# Patient Record
Sex: FEMALE | Race: WHITE | Employment: FULL TIME | URBAN - NONMETROPOLITAN AREA
[De-identification: names, ages, dates, MRNs, and addresses within clinical notes are randomized per-mention and may not be internally consistent; named-entity substitution may affect disease eponyms.]

---

## 2017-08-15 ENCOUNTER — HOSPITAL ENCOUNTER (OUTPATIENT)
Dept: GENERAL RADIOLOGY | Age: 27
Discharge: HOME | End: 2017-08-15
Payer: COMMERCIAL

## 2017-08-15 ENCOUNTER — HOSPITAL ENCOUNTER (OUTPATIENT)
Age: 27
Discharge: HOME | End: 2017-08-15
Payer: COMMERCIAL

## 2017-08-15 DIAGNOSIS — R52 PAIN: ICD-10-CM

## 2017-08-15 DIAGNOSIS — M54.2 NECK PAIN: ICD-10-CM

## 2017-08-15 PROCEDURE — 72040 X-RAY EXAM NECK SPINE 2-3 VW: CPT

## 2017-08-15 PROCEDURE — 73110 X-RAY EXAM OF WRIST: CPT

## 2018-03-16 ENCOUNTER — HOSPITAL ENCOUNTER (OUTPATIENT)
Dept: ULTRASOUND IMAGING | Age: 28
Discharge: HOME | End: 2018-03-16
Payer: COMMERCIAL

## 2018-03-16 DIAGNOSIS — R52 PAIN: ICD-10-CM

## 2018-03-16 PROCEDURE — 76705 ECHO EXAM OF ABDOMEN: CPT

## 2018-06-22 RX ORDER — SODIUM CHLORIDE 450 MG/100ML
INJECTION, SOLUTION INTRAVENOUS CONTINUOUS
Status: CANCELLED | OUTPATIENT
Start: 2018-06-22

## 2018-06-25 ENCOUNTER — HOSPITAL ENCOUNTER (OUTPATIENT)
Dept: GENERAL RADIOLOGY | Age: 28
Discharge: HOME | End: 2018-06-25
Payer: COMMERCIAL

## 2018-06-25 ENCOUNTER — HOSPITAL ENCOUNTER (OUTPATIENT)
Dept: CT IMAGING | Age: 28
Discharge: HOME | End: 2018-06-25
Payer: COMMERCIAL

## 2018-06-25 VITALS
BODY MASS INDEX: 32.71 KG/M2 | RESPIRATION RATE: 16 BRPM | OXYGEN SATURATION: 99 % | DIASTOLIC BLOOD PRESSURE: 86 MMHG | HEART RATE: 60 BPM | WEIGHT: 228 LBS | TEMPERATURE: 98.8 F | SYSTOLIC BLOOD PRESSURE: 135 MMHG

## 2018-06-25 PROCEDURE — 72240 MYELOGRAPHY NECK SPINE: CPT

## 2018-06-25 PROCEDURE — 72126 CT NECK SPINE W/DYE: CPT

## 2018-06-25 PROCEDURE — 2580000003 HC RX 258: Performed by: RADIOLOGY

## 2018-06-25 PROCEDURE — 6360000004 HC RX CONTRAST MEDICATION: Performed by: ORTHOPAEDIC SURGERY

## 2018-06-25 RX ORDER — SODIUM CHLORIDE 450 MG/100ML
INJECTION, SOLUTION INTRAVENOUS CONTINUOUS
Status: DISCONTINUED | OUTPATIENT
Start: 2018-06-25 | End: 2018-06-26 | Stop reason: HOSPADM

## 2018-06-25 RX ADMIN — SODIUM CHLORIDE: 4.5 INJECTION, SOLUTION INTRAVENOUS at 08:10

## 2018-06-25 RX ADMIN — SODIUM CHLORIDE: 4.5 INJECTION, SOLUTION INTRAVENOUS at 08:17

## 2018-06-25 RX ADMIN — IOHEXOL 10 ML: 300 INJECTION, SOLUTION INTRAVENOUS at 09:28

## 2018-06-25 ASSESSMENT — PAIN SCALES - GENERAL
PAINLEVEL_OUTOF10: 10
PAINLEVEL_OUTOF10: 2
PAINLEVEL_OUTOF10: 3

## 2018-06-25 ASSESSMENT — PAIN - FUNCTIONAL ASSESSMENT: PAIN_FUNCTIONAL_ASSESSMENT: 0-10

## 2018-06-26 ENCOUNTER — HOSPITAL ENCOUNTER (EMERGENCY)
Age: 28
Discharge: HOME | End: 2018-06-26
Payer: COMMERCIAL

## 2018-06-26 VITALS
TEMPERATURE: 98.2 F | HEIGHT: 67 IN | BODY MASS INDEX: 35.79 KG/M2 | HEART RATE: 63 BPM | WEIGHT: 228 LBS | OXYGEN SATURATION: 98 % | SYSTOLIC BLOOD PRESSURE: 122 MMHG | DIASTOLIC BLOOD PRESSURE: 79 MMHG | RESPIRATION RATE: 16 BRPM

## 2018-06-26 DIAGNOSIS — G97.1 HEADACHE, POST-MYELOGRAM: Primary | ICD-10-CM

## 2018-06-26 LAB
ALBUMIN SERPL-MCNC: 4.6 G/DL (ref 3.5–5.1)
ALP BLD-CCNC: 82 U/L (ref 38–126)
ALT SERPL-CCNC: 19 U/L (ref 11–66)
AMPHETAMINE+METHAMPHETAMINE URINE SCREEN: NEGATIVE
ANION GAP SERPL CALCULATED.3IONS-SCNC: 16 MEQ/L (ref 8–16)
AST SERPL-CCNC: 14 U/L (ref 5–40)
BACTERIA: NORMAL
BARBITURATE QUANTITATIVE URINE: NEGATIVE
BASOPHILS # BLD: 0.3 %
BASOPHILS ABSOLUTE: 0 THOU/MM3 (ref 0–0.1)
BENZODIAZEPINE QUANTITATIVE URINE: NEGATIVE
BILIRUB SERPL-MCNC: 0.3 MG/DL (ref 0.3–1.2)
BILIRUBIN DIRECT: < 0.2 MG/DL (ref 0–0.3)
BILIRUBIN URINE: NEGATIVE
BLOOD, URINE: NEGATIVE
BUN BLDV-MCNC: 9 MG/DL (ref 7–22)
CALCIUM SERPL-MCNC: 10.2 MG/DL (ref 8.5–10.5)
CANNABINOID QUANTITATIVE URINE: NEGATIVE
CASTS: NORMAL /LPF
CASTS: NORMAL /LPF
CHARACTER, URINE: CLEAR
CHLORIDE BLD-SCNC: 106 MEQ/L (ref 98–111)
CO2: 18 MEQ/L (ref 23–33)
COCAINE METABOLITE QUANTITATIVE URINE: NEGATIVE
COLOR: YELLOW
CREAT SERPL-MCNC: 0.8 MG/DL (ref 0.4–1.2)
CRYSTALS: NORMAL
D-DIMER QUANTITATIVE: 291 NG/ML FEU (ref 0–500)
EKG ATRIAL RATE: 58 BPM
EKG P AXIS: 72 DEGREES
EKG P-R INTERVAL: 160 MS
EKG Q-T INTERVAL: 428 MS
EKG QRS DURATION: 90 MS
EKG QTC CALCULATION (BAZETT): 420 MS
EKG R AXIS: 64 DEGREES
EKG T AXIS: 32 DEGREES
EKG VENTRICULAR RATE: 58 BPM
EOSINOPHIL # BLD: 0.9 %
EOSINOPHILS ABSOLUTE: 0.1 THOU/MM3 (ref 0–0.4)
EPITHELIAL CELLS, UA: NORMAL /HPF
ERYTHROCYTE [DISTWIDTH] IN BLOOD BY AUTOMATED COUNT: 12.7 % (ref 11.5–14.5)
ERYTHROCYTE [DISTWIDTH] IN BLOOD BY AUTOMATED COUNT: 42 FL (ref 35–45)
GFR SERPL CREATININE-BSD FRML MDRD: 85 ML/MIN/1.73M2
GLUCOSE BLD-MCNC: 108 MG/DL (ref 70–108)
GLUCOSE, URINE: NEGATIVE MG/DL
HCT VFR BLD CALC: 42.4 % (ref 37–47)
HEMOGLOBIN: 14.5 GM/DL (ref 12–16)
IMMATURE GRANS (ABS): 0.04 THOU/MM3 (ref 0–0.07)
IMMATURE GRANULOCYTES: 0.3 %
KETONES, URINE: NEGATIVE
LEUKOCYTE ESTERASE, URINE: NEGATIVE
LIPASE: 26 U/L (ref 5.6–51.3)
LYMPHOCYTES # BLD: 20.5 %
LYMPHOCYTES ABSOLUTE: 2.6 THOU/MM3 (ref 1–4.8)
MCH RBC QN AUTO: 30.8 PG (ref 26–33)
MCHC RBC AUTO-ENTMCNC: 34.2 GM/DL (ref 32–35)
MCV RBC AUTO: 90 FL (ref 81–99)
MISCELLANEOUS LAB TEST RESULT: NORMAL
MONOCYTES # BLD: 5.4 %
MONOCYTES ABSOLUTE: 0.7 THOU/MM3 (ref 0.4–1.3)
NITRITE, URINE: NEGATIVE
NUCLEATED RED BLOOD CELLS: 0 /100 WBC
OPIATES, URINE: NEGATIVE
OSMOLALITY CALCULATION: 278.6 MOSMOL/KG (ref 275–300)
OXYCODONE: NEGATIVE
PH UA: 7.5
PHENCYCLIDINE QUANTITATIVE URINE: NEGATIVE
PLATELET # BLD: 265 THOU/MM3 (ref 130–400)
PMV BLD AUTO: 10.6 FL (ref 9–12)
POTASSIUM SERPL-SCNC: 4 MEQ/L (ref 3.5–5.2)
PREGNANCY, SERUM: NEGATIVE
PROTEIN UA: NEGATIVE MG/DL
RBC # BLD: 4.71 MILL/MM3 (ref 4.2–5.4)
RBC URINE: NORMAL /HPF
RENAL EPITHELIAL, UA: NORMAL
SEG NEUTROPHILS: 72.6 %
SEGMENTED NEUTROPHILS ABSOLUTE COUNT: 9.1 THOU/MM3 (ref 1.8–7.7)
SODIUM BLD-SCNC: 140 MEQ/L (ref 135–145)
SPECIFIC GRAVITY UA: 1.01 (ref 1–1.03)
TOTAL PROTEIN: 7.6 G/DL (ref 6.1–8)
UROBILINOGEN, URINE: 0.2 EU/DL
WBC # BLD: 12.6 THOU/MM3 (ref 4.8–10.8)
WBC UA: NORMAL /HPF
YEAST: NORMAL

## 2018-06-26 PROCEDURE — 85379 FIBRIN DEGRADATION QUANT: CPT

## 2018-06-26 PROCEDURE — 6360000002 HC RX W HCPCS

## 2018-06-26 PROCEDURE — 80307 DRUG TEST PRSMV CHEM ANLYZR: CPT

## 2018-06-26 PROCEDURE — 83690 ASSAY OF LIPASE: CPT

## 2018-06-26 PROCEDURE — 96375 TX/PRO/DX INJ NEW DRUG ADDON: CPT

## 2018-06-26 PROCEDURE — 85025 COMPLETE CBC W/AUTO DIFF WBC: CPT

## 2018-06-26 PROCEDURE — 36415 COLL VENOUS BLD VENIPUNCTURE: CPT

## 2018-06-26 PROCEDURE — 82248 BILIRUBIN DIRECT: CPT

## 2018-06-26 PROCEDURE — 99285 EMERGENCY DEPT VISIT HI MDM: CPT

## 2018-06-26 PROCEDURE — 6360000002 HC RX W HCPCS: Performed by: PHYSICIAN ASSISTANT

## 2018-06-26 PROCEDURE — 93005 ELECTROCARDIOGRAM TRACING: CPT | Performed by: PHYSICIAN ASSISTANT

## 2018-06-26 PROCEDURE — 80053 COMPREHEN METABOLIC PANEL: CPT

## 2018-06-26 PROCEDURE — 81001 URINALYSIS AUTO W/SCOPE: CPT

## 2018-06-26 PROCEDURE — 93010 ELECTROCARDIOGRAM REPORT: CPT | Performed by: NUCLEAR MEDICINE

## 2018-06-26 PROCEDURE — 2580000003 HC RX 258: Performed by: PHYSICIAN ASSISTANT

## 2018-06-26 PROCEDURE — 96374 THER/PROPH/DIAG INJ IV PUSH: CPT

## 2018-06-26 PROCEDURE — 84703 CHORIONIC GONADOTROPIN ASSAY: CPT

## 2018-06-26 RX ORDER — ONDANSETRON 2 MG/ML
4 INJECTION INTRAMUSCULAR; INTRAVENOUS ONCE
Status: COMPLETED | OUTPATIENT
Start: 2018-06-26 | End: 2018-06-26

## 2018-06-26 RX ORDER — ONDANSETRON 4 MG/1
4 TABLET, FILM COATED ORAL EVERY 8 HOURS PRN
Qty: 15 TABLET | Refills: 0 | Status: SHIPPED | OUTPATIENT
Start: 2018-06-26 | End: 2018-07-01

## 2018-06-26 RX ORDER — 0.9 % SODIUM CHLORIDE 0.9 %
1000 INTRAVENOUS SOLUTION INTRAVENOUS ONCE
Status: COMPLETED | OUTPATIENT
Start: 2018-06-26 | End: 2018-06-26

## 2018-06-26 RX ORDER — DIPHENHYDRAMINE HYDROCHLORIDE 50 MG/ML
25 INJECTION INTRAMUSCULAR; INTRAVENOUS ONCE
Status: COMPLETED | OUTPATIENT
Start: 2018-06-26 | End: 2018-06-26

## 2018-06-26 RX ORDER — ONDANSETRON 2 MG/ML
INJECTION INTRAMUSCULAR; INTRAVENOUS
Status: COMPLETED
Start: 2018-06-26 | End: 2018-06-26

## 2018-06-26 RX ORDER — KETOROLAC TROMETHAMINE 10 MG/1
10 TABLET, FILM COATED ORAL EVERY 6 HOURS PRN
Qty: 12 TABLET | Refills: 0 | Status: SHIPPED | OUTPATIENT
Start: 2018-06-26 | End: 2021-04-19

## 2018-06-26 RX ORDER — METOCLOPRAMIDE HYDROCHLORIDE 5 MG/ML
10 INJECTION INTRAMUSCULAR; INTRAVENOUS ONCE
Status: COMPLETED | OUTPATIENT
Start: 2018-06-26 | End: 2018-06-26

## 2018-06-26 RX ORDER — KETOROLAC TROMETHAMINE 30 MG/ML
30 INJECTION, SOLUTION INTRAMUSCULAR; INTRAVENOUS ONCE
Status: COMPLETED | OUTPATIENT
Start: 2018-06-26 | End: 2018-06-26

## 2018-06-26 RX ADMIN — SODIUM CHLORIDE 1000 ML: 9 INJECTION, SOLUTION INTRAVENOUS at 09:55

## 2018-06-26 RX ADMIN — ONDANSETRON 4 MG: 2 INJECTION INTRAMUSCULAR; INTRAVENOUS at 10:01

## 2018-06-26 RX ADMIN — DIPHENHYDRAMINE HYDROCHLORIDE 25 MG: 50 INJECTION, SOLUTION INTRAMUSCULAR; INTRAVENOUS at 11:56

## 2018-06-26 RX ADMIN — KETOROLAC TROMETHAMINE 30 MG: 30 INJECTION, SOLUTION INTRAMUSCULAR at 10:02

## 2018-06-26 RX ADMIN — METOCLOPRAMIDE 10 MG: 5 INJECTION, SOLUTION INTRAMUSCULAR; INTRAVENOUS at 11:58

## 2018-06-26 ASSESSMENT — PAIN SCALES - GENERAL
PAINLEVEL_OUTOF10: 6
PAINLEVEL_OUTOF10: 10
PAINLEVEL_OUTOF10: 10

## 2018-06-26 ASSESSMENT — ENCOUNTER SYMPTOMS
WHEEZING: 0
DIARRHEA: 0
BLOOD IN STOOL: 0
TROUBLE SWALLOWING: 0
COUGH: 0
SHORTNESS OF BREATH: 1
SORE THROAT: 0
NAUSEA: 1
VOMITING: 1
FACIAL SWELLING: 0
CHEST TIGHTNESS: 0
ABDOMINAL PAIN: 0
VOICE CHANGE: 0
PHOTOPHOBIA: 0
BACK PAIN: 1

## 2018-06-26 ASSESSMENT — PAIN DESCRIPTION - DESCRIPTORS: DESCRIPTORS: ACHING

## 2018-06-26 ASSESSMENT — PAIN DESCRIPTION - LOCATION
LOCATION: GENERALIZED;HEAD
LOCATION: GENERALIZED

## 2018-06-26 ASSESSMENT — PAIN DESCRIPTION - FREQUENCY
FREQUENCY: CONTINUOUS
FREQUENCY: CONTINUOUS

## 2018-06-26 ASSESSMENT — PAIN DESCRIPTION - PAIN TYPE
TYPE: ACUTE PAIN
TYPE: ACUTE PAIN

## 2018-06-29 ENCOUNTER — HOSPITAL ENCOUNTER (EMERGENCY)
Age: 28
Discharge: HOME | End: 2018-06-29
Payer: COMMERCIAL

## 2018-06-29 ENCOUNTER — APPOINTMENT (OUTPATIENT)
Dept: INTERVENTIONAL RADIOLOGY/VASCULAR | Age: 28
End: 2018-06-29
Payer: COMMERCIAL

## 2018-06-29 ENCOUNTER — APPOINTMENT (OUTPATIENT)
Dept: CT IMAGING | Age: 28
End: 2018-06-29
Payer: COMMERCIAL

## 2018-06-29 VITALS
DIASTOLIC BLOOD PRESSURE: 72 MMHG | RESPIRATION RATE: 16 BRPM | TEMPERATURE: 98.3 F | OXYGEN SATURATION: 99 % | BODY MASS INDEX: 35.79 KG/M2 | HEIGHT: 67 IN | HEART RATE: 60 BPM | WEIGHT: 228 LBS | SYSTOLIC BLOOD PRESSURE: 128 MMHG

## 2018-06-29 DIAGNOSIS — G97.1 SPINAL PUNCTURE HEADACHE: Primary | ICD-10-CM

## 2018-06-29 LAB
ANION GAP SERPL CALCULATED.3IONS-SCNC: 13 MEQ/L (ref 8–16)
BASOPHILS # BLD: 0.3 %
BASOPHILS ABSOLUTE: 0 THOU/MM3 (ref 0–0.1)
BUN BLDV-MCNC: 10 MG/DL (ref 7–22)
CALCIUM SERPL-MCNC: 9.7 MG/DL (ref 8.5–10.5)
CHLORIDE BLD-SCNC: 109 MEQ/L (ref 98–111)
CO2: 20 MEQ/L (ref 23–33)
CREAT SERPL-MCNC: 0.8 MG/DL (ref 0.4–1.2)
EOSINOPHIL # BLD: 1.3 %
EOSINOPHILS ABSOLUTE: 0.1 THOU/MM3 (ref 0–0.4)
ERYTHROCYTE [DISTWIDTH] IN BLOOD BY AUTOMATED COUNT: 12.4 % (ref 11.5–14.5)
ERYTHROCYTE [DISTWIDTH] IN BLOOD BY AUTOMATED COUNT: 40.3 FL (ref 35–45)
GFR SERPL CREATININE-BSD FRML MDRD: 85 ML/MIN/1.73M2
GLUCOSE BLD-MCNC: 101 MG/DL (ref 70–108)
HCT VFR BLD CALC: 40.1 % (ref 37–47)
HEMOGLOBIN: 13.7 GM/DL (ref 12–16)
IMMATURE GRANS (ABS): 0.02 THOU/MM3 (ref 0–0.07)
IMMATURE GRANULOCYTES: 0.2 %
LYMPHOCYTES # BLD: 20.6 %
LYMPHOCYTES ABSOLUTE: 2.1 THOU/MM3 (ref 1–4.8)
MAGNESIUM: 2 MG/DL (ref 1.6–2.4)
MCH RBC QN AUTO: 30.4 PG (ref 26–33)
MCHC RBC AUTO-ENTMCNC: 34.2 GM/DL (ref 32.2–35.5)
MCV RBC AUTO: 88.9 FL (ref 81–99)
MONOCYTES # BLD: 8.4 %
MONOCYTES ABSOLUTE: 0.8 THOU/MM3 (ref 0.4–1.3)
NUCLEATED RED BLOOD CELLS: 0 /100 WBC
OSMOLALITY CALCULATION: 282.3 MOSMOL/KG (ref 275–300)
PLATELET # BLD: 241 THOU/MM3 (ref 130–400)
PMV BLD AUTO: 10.2 FL (ref 9.4–12.4)
POTASSIUM SERPL-SCNC: 3.8 MEQ/L (ref 3.5–5.2)
PREGNANCY, SERUM: NEGATIVE
RBC # BLD: 4.51 MILL/MM3 (ref 4.2–5.4)
SEG NEUTROPHILS: 69.2 %
SEGMENTED NEUTROPHILS ABSOLUTE COUNT: 7 THOU/MM3 (ref 1.8–7.7)
SODIUM BLD-SCNC: 142 MEQ/L (ref 135–145)
WBC # BLD: 10.1 THOU/MM3 (ref 4.8–10.8)

## 2018-06-29 PROCEDURE — 96374 THER/PROPH/DIAG INJ IV PUSH: CPT

## 2018-06-29 PROCEDURE — 99284 EMERGENCY DEPT VISIT MOD MDM: CPT

## 2018-06-29 PROCEDURE — 2580000003 HC RX 258: Performed by: PHYSICIAN ASSISTANT

## 2018-06-29 PROCEDURE — 62273 INJECT EPIDURAL PATCH: CPT

## 2018-06-29 PROCEDURE — 85025 COMPLETE CBC W/AUTO DIFF WBC: CPT

## 2018-06-29 PROCEDURE — 36415 COLL VENOUS BLD VENIPUNCTURE: CPT

## 2018-06-29 PROCEDURE — 83735 ASSAY OF MAGNESIUM: CPT

## 2018-06-29 PROCEDURE — 80048 BASIC METABOLIC PNL TOTAL CA: CPT

## 2018-06-29 PROCEDURE — 6360000002 HC RX W HCPCS

## 2018-06-29 PROCEDURE — 84703 CHORIONIC GONADOTROPIN ASSAY: CPT

## 2018-06-29 PROCEDURE — 77003 FLUOROGUIDE FOR SPINE INJECT: CPT

## 2018-06-29 PROCEDURE — 6360000004 HC RX CONTRAST MEDICATION: Performed by: RADIOLOGY

## 2018-06-29 PROCEDURE — 96375 TX/PRO/DX INJ NEW DRUG ADDON: CPT

## 2018-06-29 PROCEDURE — 70450 CT HEAD/BRAIN W/O DYE: CPT

## 2018-06-29 PROCEDURE — 2500000003 HC RX 250 WO HCPCS

## 2018-06-29 RX ORDER — DIPHENHYDRAMINE HYDROCHLORIDE 50 MG/ML
INJECTION INTRAMUSCULAR; INTRAVENOUS
Status: COMPLETED
Start: 2018-06-29 | End: 2018-06-29

## 2018-06-29 RX ORDER — DIPHENHYDRAMINE HYDROCHLORIDE 50 MG/ML
25 INJECTION INTRAMUSCULAR; INTRAVENOUS ONCE
Status: COMPLETED | OUTPATIENT
Start: 2018-06-29 | End: 2018-06-29

## 2018-06-29 RX ORDER — LORAZEPAM 2 MG/ML
1 INJECTION INTRAMUSCULAR ONCE
Status: COMPLETED | OUTPATIENT
Start: 2018-06-29 | End: 2018-06-29

## 2018-06-29 RX ORDER — ONDANSETRON 2 MG/ML
INJECTION INTRAMUSCULAR; INTRAVENOUS
Status: COMPLETED
Start: 2018-06-29 | End: 2018-06-29

## 2018-06-29 RX ORDER — ONDANSETRON 4 MG/1
4 TABLET, ORALLY DISINTEGRATING ORAL EVERY 8 HOURS PRN
Qty: 20 TABLET | Refills: 0 | Status: SHIPPED | OUTPATIENT
Start: 2018-06-29 | End: 2021-04-19

## 2018-06-29 RX ORDER — ACETAMINOPHEN 500 MG
500 TABLET ORAL EVERY 6 HOURS PRN
Qty: 120 TABLET | Refills: 0 | Status: SHIPPED | OUTPATIENT
Start: 2018-06-29 | End: 2021-04-19

## 2018-06-29 RX ORDER — LORAZEPAM 2 MG/ML
INJECTION INTRAMUSCULAR
Status: COMPLETED
Start: 2018-06-29 | End: 2018-06-29

## 2018-06-29 RX ORDER — ONDANSETRON 2 MG/ML
4 INJECTION INTRAMUSCULAR; INTRAVENOUS ONCE
Status: COMPLETED | OUTPATIENT
Start: 2018-06-29 | End: 2018-06-29

## 2018-06-29 RX ORDER — 0.9 % SODIUM CHLORIDE 0.9 %
1000 INTRAVENOUS SOLUTION INTRAVENOUS ONCE
Status: COMPLETED | OUTPATIENT
Start: 2018-06-29 | End: 2018-06-29

## 2018-06-29 RX ADMIN — IOHEXOL 1 ML: 180 INJECTION INTRAVENOUS at 11:10

## 2018-06-29 RX ADMIN — ONDANSETRON 4 MG: 2 INJECTION INTRAMUSCULAR; INTRAVENOUS at 10:14

## 2018-06-29 RX ADMIN — DIPHENHYDRAMINE HYDROCHLORIDE 25 MG: 50 INJECTION, SOLUTION INTRAMUSCULAR; INTRAVENOUS at 10:14

## 2018-06-29 RX ADMIN — LORAZEPAM 1 MG: 2 INJECTION, SOLUTION INTRAMUSCULAR; INTRAVENOUS at 10:14

## 2018-06-29 RX ADMIN — SODIUM CHLORIDE 1000 ML: 9 INJECTION, SOLUTION INTRAVENOUS at 10:13

## 2018-06-29 RX ADMIN — LORAZEPAM 1 MG: 2 INJECTION INTRAMUSCULAR at 10:14

## 2018-06-29 RX ADMIN — DIPHENHYDRAMINE HYDROCHLORIDE 25 MG: 50 INJECTION INTRAMUSCULAR; INTRAVENOUS at 10:14

## 2018-06-29 ASSESSMENT — PAIN DESCRIPTION - DESCRIPTORS
DESCRIPTORS: ACHING;DULL
DESCRIPTORS: THROBBING

## 2018-06-29 ASSESSMENT — ENCOUNTER SYMPTOMS
WHEEZING: 0
RHINORRHEA: 0
EYE REDNESS: 0
ABDOMINAL PAIN: 0
BACK PAIN: 0
SORE THROAT: 0
PHOTOPHOBIA: 1
SHORTNESS OF BREATH: 0
COUGH: 0
DIARRHEA: 0
CONSTIPATION: 0
VOMITING: 1
NAUSEA: 1
EYE DISCHARGE: 0
COLOR CHANGE: 0

## 2018-06-29 ASSESSMENT — PAIN DESCRIPTION - LOCATION
LOCATION: HEAD
LOCATION: HEAD

## 2018-06-29 ASSESSMENT — PAIN DESCRIPTION - PAIN TYPE
TYPE: ACUTE PAIN
TYPE: ACUTE PAIN

## 2018-06-29 ASSESSMENT — PAIN SCALES - GENERAL
PAINLEVEL_OUTOF10: 8
PAINLEVEL_OUTOF10: 9

## 2018-08-28 ENCOUNTER — HOSPITAL ENCOUNTER (OUTPATIENT)
Dept: GENERAL RADIOLOGY | Age: 28
Discharge: HOME | End: 2018-08-28
Payer: COMMERCIAL

## 2018-08-28 ENCOUNTER — HOSPITAL ENCOUNTER (OUTPATIENT)
Age: 28
Discharge: HOME | End: 2018-08-28
Payer: COMMERCIAL

## 2018-08-28 DIAGNOSIS — M25.532 LEFT WRIST PAIN: ICD-10-CM

## 2018-08-28 LAB — MRSA SCREEN RT-PCR: NEGATIVE

## 2018-08-28 PROCEDURE — 73110 X-RAY EXAM OF WRIST: CPT

## 2018-08-28 PROCEDURE — 87641 MR-STAPH DNA AMP PROBE: CPT

## 2019-03-12 ENCOUNTER — HOSPITAL ENCOUNTER (EMERGENCY)
Age: 29
Discharge: HOME | End: 2019-03-12
Payer: COMMERCIAL

## 2019-03-12 VITALS
DIASTOLIC BLOOD PRESSURE: 71 MMHG | HEIGHT: 67 IN | HEART RATE: 70 BPM | RESPIRATION RATE: 16 BRPM | WEIGHT: 230 LBS | TEMPERATURE: 98.2 F | OXYGEN SATURATION: 98 % | BODY MASS INDEX: 36.1 KG/M2 | SYSTOLIC BLOOD PRESSURE: 121 MMHG

## 2019-03-12 DIAGNOSIS — L02.211 CUTANEOUS ABSCESS OF ABDOMINAL WALL: Primary | ICD-10-CM

## 2019-03-12 PROCEDURE — 87186 SC STD MICRODIL/AGAR DIL: CPT

## 2019-03-12 PROCEDURE — 87070 CULTURE OTHR SPECIMN AEROBIC: CPT

## 2019-03-12 PROCEDURE — 87077 CULTURE AEROBIC IDENTIFY: CPT

## 2019-03-12 PROCEDURE — 87205 SMEAR GRAM STAIN: CPT

## 2019-03-12 PROCEDURE — 99213 OFFICE O/P EST LOW 20 MIN: CPT | Performed by: NURSE PRACTITIONER

## 2019-03-12 PROCEDURE — 87147 CULTURE TYPE IMMUNOLOGIC: CPT

## 2019-03-12 PROCEDURE — 99213 OFFICE O/P EST LOW 20 MIN: CPT

## 2019-03-12 RX ORDER — SULFAMETHOXAZOLE AND TRIMETHOPRIM 800; 160 MG/1; MG/1
1 TABLET ORAL 2 TIMES DAILY
Qty: 14 TABLET | Refills: 0 | Status: SHIPPED | OUTPATIENT
Start: 2019-03-12 | End: 2019-03-19

## 2019-03-12 ASSESSMENT — PAIN DESCRIPTION - LOCATION: LOCATION: ABDOMEN

## 2019-03-12 ASSESSMENT — ENCOUNTER SYMPTOMS
VOMITING: 0
NAUSEA: 0

## 2019-03-12 ASSESSMENT — PAIN DESCRIPTION - FREQUENCY: FREQUENCY: CONTINUOUS

## 2019-03-12 ASSESSMENT — PAIN DESCRIPTION - ORIENTATION: ORIENTATION: LEFT;LOWER

## 2019-03-12 ASSESSMENT — PAIN DESCRIPTION - DESCRIPTORS: DESCRIPTORS: DISCOMFORT

## 2019-03-12 ASSESSMENT — PAIN DESCRIPTION - PAIN TYPE: TYPE: ACUTE PAIN

## 2019-03-15 LAB
AEROBIC CULTURE: ABNORMAL
GRAM STAIN RESULT: ABNORMAL
ORGANISM: ABNORMAL

## 2021-04-19 ENCOUNTER — HOSPITAL ENCOUNTER (EMERGENCY)
Age: 31
Discharge: HOME | End: 2021-04-19
Payer: COMMERCIAL

## 2021-04-19 VITALS
HEART RATE: 78 BPM | DIASTOLIC BLOOD PRESSURE: 77 MMHG | TEMPERATURE: 98.3 F | SYSTOLIC BLOOD PRESSURE: 122 MMHG | OXYGEN SATURATION: 98 % | RESPIRATION RATE: 16 BRPM

## 2021-04-19 DIAGNOSIS — N30.01 ACUTE CYSTITIS WITH HEMATURIA: ICD-10-CM

## 2021-04-19 DIAGNOSIS — N73.9 FEMALE PELVIC INFLAMMATORY DISEASE: Primary | ICD-10-CM

## 2021-04-19 LAB
BILIRUBIN URINE: NEGATIVE
BLOOD, URINE: ABNORMAL
CHARACTER, URINE: ABNORMAL
CHLAMYDIA TRACHOMATIS BY RT-PCR: NOT DETECTED
COLOR: ABNORMAL
CT/NG SOURCE: NORMAL
GLUCOSE URINE: NEGATIVE MG/DL
KETONES, URINE: ABNORMAL
LEUKOCYTE ESTERASE, URINE: ABNORMAL
NEISSERIA GONORRHOEAE BY RT-PCR: NOT DETECTED
NITRITE, URINE: NEGATIVE
PH UA: 6 (ref 5–9)
PREGNANCY, URINE: NEGATIVE
PROTEIN UA: 100 MG/DL
SPECIFIC GRAVITY, URINE: 1.02 (ref 1–1.03)
UROBILINOGEN, URINE: 0.2 EU/DL (ref 0.2–1)

## 2021-04-19 PROCEDURE — 2500000003 HC RX 250 WO HCPCS: Performed by: NURSE PRACTITIONER

## 2021-04-19 PROCEDURE — 87086 URINE CULTURE/COLONY COUNT: CPT

## 2021-04-19 PROCEDURE — 87591 N.GONORRHOEAE DNA AMP PROB: CPT

## 2021-04-19 PROCEDURE — 81003 URINALYSIS AUTO W/O SCOPE: CPT

## 2021-04-19 PROCEDURE — 6360000002 HC RX W HCPCS: Performed by: NURSE PRACTITIONER

## 2021-04-19 PROCEDURE — 87205 SMEAR GRAM STAIN: CPT

## 2021-04-19 PROCEDURE — 99213 OFFICE O/P EST LOW 20 MIN: CPT

## 2021-04-19 PROCEDURE — 81025 URINE PREGNANCY TEST: CPT

## 2021-04-19 PROCEDURE — 87491 CHLMYD TRACH DNA AMP PROBE: CPT

## 2021-04-19 PROCEDURE — 99214 OFFICE O/P EST MOD 30 MIN: CPT | Performed by: NURSE PRACTITIONER

## 2021-04-19 PROCEDURE — 87070 CULTURE OTHR SPECIMN AEROBIC: CPT

## 2021-04-19 PROCEDURE — 96372 THER/PROPH/DIAG INJ SC/IM: CPT

## 2021-04-19 RX ORDER — FLUCONAZOLE 150 MG/1
150 TABLET ORAL ONCE
Qty: 2 TABLET | Refills: 0 | Status: SHIPPED | OUTPATIENT
Start: 2021-04-19 | End: 2021-04-19

## 2021-04-19 RX ORDER — GREEN TEA/HOODIA GORDONII 315-12.5MG
1 CAPSULE ORAL 2 TIMES DAILY
Qty: 28 TABLET | Refills: 0 | COMMUNITY
Start: 2021-04-19 | End: 2021-05-03

## 2021-04-19 RX ORDER — DOXYCYCLINE HYCLATE 100 MG
100 TABLET ORAL 2 TIMES DAILY
Qty: 28 TABLET | Refills: 0 | Status: SHIPPED | OUTPATIENT
Start: 2021-04-19 | End: 2021-05-03

## 2021-04-19 RX ORDER — NITROFURANTOIN 25; 75 MG/1; MG/1
100 CAPSULE ORAL 2 TIMES DAILY
Qty: 10 CAPSULE | Refills: 0 | Status: SHIPPED | OUTPATIENT
Start: 2021-04-19 | End: 2021-04-24

## 2021-04-19 RX ADMIN — LIDOCAINE HYDROCHLORIDE 500 MG: 10 INJECTION, SOLUTION EPIDURAL; INFILTRATION; INTRACAUDAL; PERINEURAL at 09:33

## 2021-04-19 ASSESSMENT — ENCOUNTER SYMPTOMS
DIARRHEA: 0
NAUSEA: 0
VOMITING: 0
CONSTIPATION: 0

## 2021-04-19 NOTE — ED TRIAGE NOTES
Amairani Sutton arrives to room with complaint of lower ab pain vaginal discharge symptoms started 2 days ago.

## 2021-04-19 NOTE — ED PROVIDER NOTES
[address]  Urgent Care Encounter       CHIEF COMPLAINT       Chief Complaint   Patient presents with    Vaginal Discharge    Abdominal Pain       Nurses Notes reviewed and I agree except as noted in the HPI. HISTORY OF PRESENT ILLNESS   Sunitha Phan is a 27 y.o. female who presents with complaints of lower abdominal pain, onset 2 days ago. Patient reports dull, aching pain that is constant rated a 3 out of 10. She has occasional sharp pains rated 10 out of 10. She noticed a small amount of brownish, pink mucousy discharge this morning. She denies dysuria, urgency and frequency. She has had a 1 sexual partner which was new 2 months ago. Prior to this partner, she has not had sexual intercourse \"in a while. \"  She denies vaginal pain. No fever, chills, nausea, vomiting, diarrhea or constipation. She reports daily BMs with the last one being yesterday with no change in pattern. The history is provided by the patient. REVIEW OF SYSTEMS     Review of Systems   Constitutional: Negative for appetite change, chills and fever. Gastrointestinal: Negative for constipation, diarrhea, nausea and vomiting. Genitourinary: Positive for menstrual problem (No.  Since the age of 16 due to Depo-Medrol), pelvic pain and vaginal discharge. Negative for difficulty urinating, dyspareunia, dysuria, flank pain, frequency, genital sores, urgency, vaginal bleeding and vaginal pain. Skin: Negative for rash. Neurological: Negative for headaches. PAST MEDICAL HISTORY         Diagnosis Date    Headache        SURGICALHISTORY     Patient  has a past surgical history that includes Bladder surgery. CURRENT MEDICATIONS       Previous Medications    MEDROXYPROGESTERONE ACETATE (DEPO-PROVERA IM)    Inject into the muscle       ALLERGIES     Patient is has No Known Allergies.     Patients   Immunization History   Administered Date(s) Administered    Tdap (Boostrix, Adacel) 11/09/2013 FAMILY HISTORY     Patient's family history is not on file. SOCIAL HISTORY     Patient  reports that she has been smoking cigarettes. She has a 3.00 pack-year smoking history. She has never used smokeless tobacco. She reports current alcohol use. She reports that she does not use drugs. PHYSICAL EXAM     ED TRIAGE VITALS   ,  ,  ,  ,  ,Estimated body mass index is 36.02 kg/m² as calculated from the following:    Height as of 3/12/19: 5' 7\" (1.702 m). Weight as of 3/12/19: 230 lb (104.3 kg). ,No LMP recorded. Patient has had an injection. Physical Exam  Vitals signs and nursing note reviewed. Exam conducted with a chaperone present Christine Betancourt RN). Constitutional:       General: She is not in acute distress. Appearance: She is well-developed. HENT:      Head: Normocephalic and atraumatic. Pulmonary:      Effort: Pulmonary effort is normal. No respiratory distress. Abdominal:      General: Bowel sounds are normal. There is no distension. Palpations: Abdomen is soft. Tenderness: There is abdominal tenderness in the right lower quadrant, suprapubic area and left lower quadrant. There is guarding (slight). There is no right CVA tenderness or left CVA tenderness. Negative signs include Rovsing's sign, McBurney's sign, psoas sign and obturator sign. Genitourinary:     General: Normal vulva. Pubic Area: No rash. Labia:         Right: No tenderness or lesion. Left: No tenderness or lesion. Vagina: Vaginal discharge (homogeneous, thick lt brown) present. No erythema. Cervix: Cervical motion tenderness present. No friability or erythema. Uterus: Tender. Adnexa:         Right: Tenderness present. Left: Tenderness present. Skin:     General: Skin is warm and dry. Neurological:      General: No focal deficit present. Mental Status: She is alert and oriented to person, place, and time.    Psychiatric:         Mood and Affect: Mood normal.         Speech: Speech normal.         Behavior: Behavior normal. Behavior is cooperative. DIAGNOSTIC RESULTS     Labs:  Results for orders placed or performed during the hospital encounter of 04/19/21   Urinalysis   Result Value Ref Range    Glucose, Ur Negative NEGATIVE mg/dl    Bilirubin Urine Negative NEGATIVE    Ketones, Urine Trace (A) NEGATIVE    Specific Gravity, Urine 1.025 1.002 - 1.030    Blood, Urine Large (A) NEGATIVE    pH, UA 6.00 5.0 - 9.0    Protein,  (A) NEGATIVE mg/dl    Urobilinogen, Urine 0.20 0.2 - 1.0 eu/dl    Nitrite, Urine Negative NEGATIVE    Leukocyte Esterase, Urine Moderate (A) NEGATIVE    Color, UA Julia (A) STRAW-YELLOW    Character, Urine Slightly Cloudy CLEAR-SL CLOUD   Pregnancy, Urine   Result Value Ref Range    Pregnancy, Urine NEGATIVE NEGATIVE       IMAGING:    No orders to display         EKG:      URGENT CARE COURSE:     There were no vitals filed for this visit. Medications   cefTRIAXone (ROCEPHIN) 500 mg in lidocaine 1 % 1 mL IM Injection (500 mg Intramuscular Given 4/19/21 0933)            PROCEDURES:  None    FINAL IMPRESSION      1. Female pelvic inflammatory disease    2. Acute cystitis with hematuria          DISPOSITION/ PLAN     Patient presents with acute PID as well as a UTI. Homogenous, brownish colored discharge noted. CMT and adnexal tenderness noted on bimanual exam.  Genital culture and swab for chlamydia and gonorrhea obtained. Patient treated with ceftriaxone while in the urgent care center. Prescription for doxycycline provided. Patient also given prescription for nitrofurantoin to cover the UTI. Patient instructed to use over-the-counter probiotic twice daily while taking the antibiotics. Patient also given Diflucan for potential yeast infection due to the multiple antibiotics. Patient instructed to follow-up with her OB/GYN if symptoms do not begin to improve with treatment. ER for worsening condition.   No sexual intercourse until testing results have returned and are negative or antibiotics have completed. Further instructions were outlined verbally and in the patient's discharge instructions. All the patient's questions were answered. The patient/parent agreed with the plan and was discharged from the  center in good condition. PATIENT REFERRED TO:  MD JOSE Jones [facility] [id number]      DISCHARGE MEDICATIONS:  New Prescriptions    DOXYCYCLINE HYCLATE (VIBRA-TABS) 100 MG TABLET    Take 1 tablet by mouth 2 times daily for 14 days    FLUCONAZOLE (DIFLUCAN) 150 MG TABLET    Take 1 tablet by mouth once for 1 dose May repeat in 3 days as needed.     NITROFURANTOIN, MACROCRYSTAL-MONOHYDRATE, (MACROBID) 100 MG CAPSULE    Take 1 capsule by mouth 2 times daily for 5 days    PROBIOTIC ACIDOPHILUS (FLORANEX) TABS    Take 1 tablet by mouth 2 times daily for 14 days       Discontinued Medications    ACETAMINOPHEN (APAP EXTRA STRENGTH) 500 MG TABLET    Take 1 tablet by mouth every 6 hours as needed for Pain    KETOROLAC (TORADOL) 10 MG TABLET    Take 1 tablet by mouth every 6 hours as needed for Pain    ONDANSETRON (ZOFRAN ODT) 4 MG DISINTEGRATING TABLET    Take 1 tablet by mouth every 8 hours as needed for Nausea       Current Discharge Medication List          JUAN MANUEL Leos CNP    (Please note that portions of this note were completed with a voice recognition program. Efforts were made to edit the dictations but occasionally words are mis-transcribed.)         JUAN MANUEL Leos CNP  04/19/21 1030

## 2021-04-21 LAB
ORGANISM: ABNORMAL
URINE CULTURE, ROUTINE: ABNORMAL

## 2021-04-22 LAB
GENITAL CULTURE, ROUTINE: NORMAL
GRAM STAIN RESULT: NORMAL

## 2022-02-14 ASSESSMENT — ENCOUNTER SYMPTOMS
SORE THROAT: 0
DIARRHEA: 0
SHORTNESS OF BREATH: 0
VOMITING: 0
RHINORRHEA: 0
BLOOD IN STOOL: 0
CONSTIPATION: 0
NAUSEA: 0
COUGH: 0
EYE REDNESS: 0

## 2022-02-14 NOTE — PROGRESS NOTES
[address]  Phone:  [phone]         [place]       Name: Milagro Skinner  : 1990          Chief Complaint:     Chief Complaint   Patient presents with   Dimas Mathis Doctor     previous Dr. Elizabeth Flores       History of Present Illness:      Milagro Skinner is a 32 y.o.  female who presents today to establish as a new patient for a health maintenance examination. She graduated from [employer].  She is single with no kids. She is working at [employer]; she just got to 1st shift after 8 years. She has a h/o headaches and was seeing a chiropractor. She got her right tragus pierced and it helped. The headaches shifted to her left side so she got that tragus pierced and her headaches are better. She has urinary incontinence that's worse at night. She thinks it's because she's such a deep sleeper that she doesn't wake up. She wears a pad at night, but doesn't seem to have trouble during the day. She had a Medtronic interstim unit placed about 9 years ago which helped some in the beginning, but not recently. She's had the batteries changed but it didn't make a difference. Health Maintenance  Smoker?:  Yes, about 1 PPD; not ready to quit yet  Healthy diet?:  For the most part  Routine exercise?:  Not really; goes up 26 steps to her apartment and that's about it  Routine dental exams?:  Yes  Routine eye exams?:  Yes  Last PAP:  2019; last was not abnormal but priors have been    Her former PCP was Dr. Elizabeth Flores.       Past Medical History:     Past Medical History:   Diagnosis Date    Headache     Nocturia     doesn't wake up at night to urinate        Past Surgical History:     Past Surgical History:   Procedure Laterality Date    BLADDER SURGERY  2013    CARPAL TUNNEL RELEASE Bilateral 2017        Medications:       Outpatient Medications Prior to Visit   Medication Sig Dispense Refill    MedroxyPROGESTERone Acetate (DEPO-PROVERA IM) Inject into the muscle       No facility-administered medications prior to visit. Allergies:       Patient has no known allergies. Social History:     Social:          Social History     Socioeconomic History    Marital status: Single     Spouse name: Not on file    Number of children: 0    Years of education: Not on file    Highest education level: Not on file   Occupational History    Not on file   Tobacco Use    Smoking status: Current Every Day Smoker     Packs/day: 1.00     Years: 3.00     Pack years: 3.00     Types: Cigarettes    Smokeless tobacco: Never Used   Vaping Use    Vaping Use: Never used   Substance and Sexual Activity    Alcohol use: Yes     Comment: 3 times per week    Drug use: No    Sexual activity: Yes     Partners: Male   Other Topics Concern    Not on file   Social History Narrative    Not on file     Social Determinants of Health     Financial Resource Strain: Low Risk     Difficulty of Paying Living Expenses: Not hard at all   Food Insecurity: No Food Insecurity    Worried About Running Out of Food in the Last Year: Never true    Pierre of Food in the Last Year: Never true   Transportation Needs:     Lack of Transportation (Medical): Not on file    Lack of Transportation (Non-Medical):  Not on file   Physical Activity:     Days of Exercise per Week: Not on file    Minutes of Exercise per Session: Not on file   Stress:     Feeling of Stress : Not on file   Social Connections:     Frequency of Communication with Friends and Family: Not on file    Frequency of Social Gatherings with Friends and Family: Not on file    Attends Zoroastrianism Services: Not on file    Active Member of Clubs or Organizations: Not on file    Attends Club or Organization Meetings: Not on file    Marital Status: Not on file   Intimate Partner Violence:     Fear of Current or Ex-Partner: Not on file    Emotionally Abused: Not on file    Physically Abused: Not on file  Sexually Abused: Not on file   Housing Stability:     Unable to Pay for Housing in the Last Year: Not on file    Number of Places Lived in the Last Year: Not on file    Unstable Housing in the Last Year: Not on file       Family History:     Family History   Problem Relation Age of Onset    Cancer Mother         skin    Stroke Father          Review of Systems:     Review of Systems   Constitutional: Negative for chills and fever. HENT: Positive for congestion. Negative for rhinorrhea, sore throat and voice change. Eyes: Negative for redness and visual disturbance. Respiratory: Negative for cough and shortness of breath. Cardiovascular: Negative for chest pain and palpitations. Gastrointestinal: Negative for blood in stool, constipation, diarrhea, nausea and vomiting. Genitourinary: Negative for hematuria. Nocturia   Musculoskeletal: Negative for arthralgias and myalgias. Allergic/Immunologic: Positive for environmental allergies. Negative for food allergies. Neurological: Positive for headaches. Negative for weakness. Psychiatric/Behavioral: Negative for dysphoric mood. The patient is not nervous/anxious. Physical Exam:     Vitals:  Blood pressure 128/82, pulse 80, temperature 97 °F (36.1 °C), temperature source Temporal, resp. rate 16, height 5' 8.5\" (1.74 m), weight 237 lb (107.5 kg), SpO2 100 %. General appearance:  Alert, well appearing, and in no acute distress. Mental status:  Oriented to person, place, and time with normal affect. Head:  Normocephalic and atraumatic. Eyes:   Extraocular eye movements intact, conjunctiva clear. Ears:  Hearing appears to be intact. Nose:  No drainage noted. Mouth:  Mucous membranes moist.  Neck:  Supple, no carotid bruits, thyroid not palpable. Heart:  Normal rate, regular rhythm, no murmurs. Lungs:  Clear to auscultation bilaterally. Respirations unlabored.   Abdomen:  Soft, nondistended, bowel sounds present all four quadrants. Neurological:  Normal speech. Extremities:  Peripheral pulses palpable, no pedal edema. Skin:  No gross lesions, rashes, or induration noted. Assessment:      Diagnosis Orders   1. Well adult exam         Plan:     Counseling Completed:  Tobacco and secondary smoke risks reviewed; instructed on cessation and avoidance. Repeat pap every 3 - 5 years if negative for women over the age of 27. Every 3 years under 27years old. Mammograms every 1 year if age > 35 yo and last mammogram was negative. Colon cancer screening reviewed age > 48, or < if indicated. Labs ordered, if indicated. Influenza vaccine recommended, if in season. Pneumonia vaccination if > 65 or indicated. Routine eye and dental exams advised. Exercise and healthy diet discussed. Return in about 1 year (around 2/17/2023) for well/preventative visit.     Electronically signed by Glenna Ruiz MD on 2/17/2022 at 3:19 PM

## 2022-02-14 NOTE — PATIENT INSTRUCTIONS
Patient Education        Well Visit, Ages 25 to 48: Care Instructions  Overview     Well visits can help you stay healthy. Your doctor has checked your overall health and may have suggested ways to take good care of yourself. Your doctor also may have recommended tests. At home, you can help prevent illness with healthy eating, regular exercise, and other steps. Follow-up care is a key part of your treatment and safety. Be sure to make and go to all appointments, and call your doctor if you are having problems. It's also a good idea to know your test results and keep a list of the medicines you take. How can you care for yourself at home? · Get screening tests that you and your doctor decide on. Screening helps find diseases before any symptoms appear. · Eat healthy foods. Choose fruits, vegetables, whole grains, protein, and low-fat dairy foods. Limit fat, especially saturated fat. Reduce salt in your diet. · Limit alcohol. If you are a man, have no more than 2 drinks a day or 14 drinks a week. If you are a woman, have no more than 1 drink a day or 7 drinks a week. · Get at least 30 minutes of physical activity on most days of the week. Walking is a good choice. You also may want to do other activities, such as running, swimming, cycling, or playing tennis or team sports. Discuss any changes in your exercise program with your doctor. · Reach and stay at a healthy weight. This will lower your risk for many problems, such as obesity, diabetes, heart disease, and high blood pressure. · Do not smoke or allow others to smoke around you. If you need help quitting, talk to your doctor about stop-smoking programs and medicines. These can increase your chances of quitting for good. · Care for your mental health. It is easy to get weighed down by worry and stress. Learn strategies to manage stress, like deep breathing and mindfulness, and stay connected with your family and community.  If you find you often feel sad or hopeless, talk with your doctor. Treatment can help. · Talk to your doctor about whether you have any risk factors for sexually transmitted infections (STIs). You can help prevent STIs if you wait to have sex with a new partner (or partners) until you've each been tested for STIs. It also helps if you use condoms (male or female condoms) and if you limit your sex partners to one person who only has sex with you. Vaccines are available for some STIs, such as HPV. · Use birth control if it's important to you to prevent pregnancy. Talk with your doctor about the choices available and what might be best for you. · If you think you may have a problem with alcohol or drug use, talk to your doctor. This includes prescription medicines (such as amphetamines and opioids) and illegal drugs (such as cocaine and methamphetamine). Your doctor can help you figure out what type of treatment is best for you. · Protect your skin from too much sun. When you're outdoors from 10 a.m. to 4 p.m., stay in the shade or cover up with clothing and a hat with a wide brim. Wear sunglasses that block UV rays. Even when it's cloudy, put broad-spectrum sunscreen (SPF 30 or higher) on any exposed skin. · See a dentist one or two times a year for checkups and to have your teeth cleaned. · Wear a seat belt in the car. When should you call for help? Watch closely for changes in your health, and be sure to contact your doctor if you have any problems or symptoms that concern you. Where can you learn more? Go to https://[employer]non.healthGenbook. org and sign in to your [employer] account. Enter P072 in the Your [employer] box to learn more about \"Well Visit, Ages 25 to 48: Care Instructions. \"     If you do not have an account, please click on the \"Sign Up Now\" link. Current as of: October 6, 2021               Content Version: 13.1  © 0599-7405 [employer]wise, Incorporated. Care instructions adapted under license by [facility] [place] ([facility]). If you have questions about a medical condition or this instruction, always ask your healthcare professional. [place] [address] any warranty or liability for your use of this information.

## 2022-02-17 ENCOUNTER — OFFICE VISIT (OUTPATIENT)
Dept: FAMILY MEDICINE CLINIC | Age: 32
End: 2022-02-17
Payer: COMMERCIAL

## 2022-02-17 VITALS
SYSTOLIC BLOOD PRESSURE: 128 MMHG | WEIGHT: 237 LBS | HEIGHT: 69 IN | HEART RATE: 80 BPM | BODY MASS INDEX: 35.1 KG/M2 | RESPIRATION RATE: 16 BRPM | OXYGEN SATURATION: 100 % | DIASTOLIC BLOOD PRESSURE: 82 MMHG | TEMPERATURE: 97 F

## 2022-02-17 DIAGNOSIS — Z30.42 ENCOUNTER FOR SURVEILLANCE OF INJECTABLE CONTRACEPTIVE: ICD-10-CM

## 2022-02-17 DIAGNOSIS — Z20.822 EXPOSURE TO COVID-19 VIRUS: ICD-10-CM

## 2022-02-17 DIAGNOSIS — Z00.00 WELL ADULT EXAM: Primary | ICD-10-CM

## 2022-02-17 LAB
Lab: NORMAL
PERFORMING INSTRUMENT: NORMAL
QC PASS/FAIL: NORMAL
SARS-COV-2, POC: NORMAL

## 2022-02-17 PROCEDURE — 99395 PREV VISIT EST AGE 18-39: CPT | Performed by: FAMILY MEDICINE

## 2022-02-17 PROCEDURE — 87426 SARSCOV CORONAVIRUS AG IA: CPT | Performed by: FAMILY MEDICINE

## 2022-02-17 SDOH — ECONOMIC STABILITY: FOOD INSECURITY: WITHIN THE PAST 12 MONTHS, YOU WORRIED THAT YOUR FOOD WOULD RUN OUT BEFORE YOU GOT MONEY TO BUY MORE.: NEVER TRUE

## 2022-02-17 SDOH — ECONOMIC STABILITY: FOOD INSECURITY: WITHIN THE PAST 12 MONTHS, THE FOOD YOU BOUGHT JUST DIDN'T LAST AND YOU DIDN'T HAVE MONEY TO GET MORE.: NEVER TRUE

## 2022-02-17 ASSESSMENT — PATIENT HEALTH QUESTIONNAIRE - PHQ9
SUM OF ALL RESPONSES TO PHQ QUESTIONS 1-9: 0
2. FEELING DOWN, DEPRESSED OR HOPELESS: 0
SUM OF ALL RESPONSES TO PHQ QUESTIONS 1-9: 0
SUM OF ALL RESPONSES TO PHQ QUESTIONS 1-9: 0
SUM OF ALL RESPONSES TO PHQ9 QUESTIONS 1 & 2: 0
SUM OF ALL RESPONSES TO PHQ QUESTIONS 1-9: 0
1. LITTLE INTEREST OR PLEASURE IN DOING THINGS: 0

## 2022-02-17 ASSESSMENT — ENCOUNTER SYMPTOMS: VOICE CHANGE: 0

## 2022-02-17 ASSESSMENT — SOCIAL DETERMINANTS OF HEALTH (SDOH): HOW HARD IS IT FOR YOU TO PAY FOR THE VERY BASICS LIKE FOOD, HOUSING, MEDICAL CARE, AND HEATING?: NOT HARD AT ALL

## 2022-02-21 ENCOUNTER — TELEPHONE (OUTPATIENT)
Dept: FAMILY MEDICINE CLINIC | Age: 32
End: 2022-02-21

## 2022-02-21 NOTE — TELEPHONE ENCOUNTER
Appointment made with Brenda Perea, 03/15/2022 @ [phone]. [address]. Left message for pt to call back.

## 2022-03-16 ENCOUNTER — HOSPITAL ENCOUNTER (EMERGENCY)
Age: 32
Discharge: HOME | End: 2022-03-16
Attending: FAMILY MEDICINE
Payer: COMMERCIAL

## 2022-03-16 VITALS
WEIGHT: 230 LBS | BODY MASS INDEX: 34.86 KG/M2 | OXYGEN SATURATION: 99 % | TEMPERATURE: 98.1 F | DIASTOLIC BLOOD PRESSURE: 86 MMHG | RESPIRATION RATE: 14 BRPM | SYSTOLIC BLOOD PRESSURE: 135 MMHG | HEIGHT: 68 IN | HEART RATE: 83 BPM

## 2022-03-16 DIAGNOSIS — K05.219 ACUTE PERIODONTAL ABSCESS: Primary | ICD-10-CM

## 2022-03-16 PROCEDURE — 99284 EMERGENCY DEPT VISIT MOD MDM: CPT

## 2022-03-16 PROCEDURE — 6370000000 HC RX 637 (ALT 250 FOR IP): Performed by: FAMILY MEDICINE

## 2022-03-16 RX ORDER — PENICILLIN V POTASSIUM 500 MG/1
500 TABLET ORAL 4 TIMES DAILY
Qty: 28 TABLET | Refills: 0 | Status: SHIPPED | OUTPATIENT
Start: 2022-03-16 | End: 2022-03-23

## 2022-03-16 RX ORDER — IBUPROFEN 200 MG
400 TABLET ORAL EVERY 6 HOURS PRN
COMMUNITY

## 2022-03-16 RX ORDER — PENICILLIN V POTASSIUM 250 MG/1
500 TABLET ORAL ONCE
Status: COMPLETED | OUTPATIENT
Start: 2022-03-16 | End: 2022-03-16

## 2022-03-16 RX ADMIN — PENICILLIN V POTASSIUM 500 MG: 250 TABLET, FILM COATED ORAL at 01:40

## 2022-03-16 ASSESSMENT — PAIN DESCRIPTION - DESCRIPTORS
DESCRIPTORS: DULL
DESCRIPTORS: DULL

## 2022-03-16 ASSESSMENT — PAIN SCALES - GENERAL
PAINLEVEL_OUTOF10: 3
PAINLEVEL_OUTOF10: 2

## 2022-03-16 ASSESSMENT — PAIN DESCRIPTION - ORIENTATION: ORIENTATION: LOWER

## 2022-03-16 ASSESSMENT — ENCOUNTER SYMPTOMS
NAUSEA: 0
SORE THROAT: 0
DIARRHEA: 0
ABDOMINAL PAIN: 0
BACK PAIN: 0
COUGH: 0
VOMITING: 0
SHORTNESS OF BREATH: 0
WHEEZING: 0

## 2022-03-16 ASSESSMENT — PAIN DESCRIPTION - LOCATION
LOCATION: MOUTH
LOCATION: MOUTH

## 2022-03-16 NOTE — ED PROVIDER NOTES
[address]/[facility] Services COMPLAINT       Chief Complaint   Patient presents with    Oral Swelling     lower mouth       Nurses Notes reviewed and I agree except as noted in the HPI. HISTORY OF PRESENT ILLNESS    Claribel Linares is a 32 y.o. female who presents for evaluation of a lump in the lower left mouth on the inner aspect of the gum. Patient states that she noticed this area hurting yesterday and that she called her dentist but unfortunately her dentist is currently in [place]. She states that she took Advil around 1:30 AM and so pain is controlled at a 3/10 in severity but she awoke and noticed that there was swelling to this region so presents for evaluation. She is not had any fevers or chills. She has not had problems with this in the past.  No difficulty swallowing or breathing. REVIEW OF SYSTEMS     Review of Systems   Constitutional: Negative for activity change, appetite change, chills and fever. HENT: Positive for dental problem. Negative for ear pain and sore throat. Respiratory: Negative for cough, shortness of breath and wheezing. Cardiovascular: Negative for chest pain and leg swelling. Gastrointestinal: Negative for abdominal pain, diarrhea, nausea and vomiting. Genitourinary: Negative for dysuria, flank pain and hematuria. Musculoskeletal: Negative for arthralgias, back pain, gait problem and neck pain. Skin: Negative for rash and wound. Neurological: Negative for weakness, light-headedness and headaches. Psychiatric/Behavioral: Negative for agitation and hallucinations. The patient is not nervous/anxious. PAST MEDICAL HISTORY    has a past medical history of Headache and Nocturia. SURGICAL HISTORY      has a past surgical history that includes Bladder surgery (2013) and Carpal tunnel release (Bilateral, 2017).     CURRENT MEDICATIONS       Discharge Medication List as of 3/16/2022  1:49 AM CONTINUE these medications which have NOT CHANGED    Details   ibuprofen (ADVIL;MOTRIN) 200 MG tablet Take 400 mg by mouth every 6 hours as needed for PainHistorical Med      MedroxyPROGESTERone Acetate (DEPO-PROVERA IM) Inject into the muscle             ALLERGIES     has No Known Allergies. FAMILY HISTORY     She indicated that her mother is alive. She indicated that her father is alive. family history includes Cancer in her mother; Stroke in her father. SOCIAL HISTORY      reports that she has been smoking cigarettes. She has a 3.00 pack-year smoking history. She has never used smokeless tobacco. She reports current alcohol use. She reports that she does not use drugs. PHYSICAL EXAM     INITIAL VITALS:  height is 5' 8\" (1.727 m) and weight is 230 lb (104.3 kg). Her oral temperature is 98.1 °F (36.7 °C). Her blood pressure is 135/86 and her pulse is 83. Her respiration is 14 and oxygen saturation is 99%. Physical Exam  Vitals and nursing note reviewed. Constitutional:       General: She is not in acute distress. Appearance: She is well-developed. HENT:      Head: Normocephalic and atraumatic. Mouth/Throat:      Mouth: Mucous membranes are moist.      Pharynx: No posterior oropharyngeal erythema. Comments: There is mild fullness to the inner buccal mucosa of the left lower lateral incisor and canine. No pustular drainage noted at this time. Eyes:      General:         Right eye: No discharge. Left eye: No discharge. Conjunctiva/sclera: Conjunctivae normal.   Neck:      Comments: No supraclavicular lymphadenopathy. Cardiovascular:      Rate and Rhythm: Normal rate and regular rhythm. Heart sounds: Normal heart sounds. Pulmonary:      Effort: Pulmonary effort is normal.      Breath sounds: Normal breath sounds. Abdominal:      General: Bowel sounds are normal. There is no distension. Palpations: Abdomen is soft. There is no mass.       Tenderness: There is no abdominal tenderness. Musculoskeletal:      Cervical back: Normal range of motion and neck supple. Lymphadenopathy:      Cervical: No cervical adenopathy. Skin:     General: Skin is warm and dry. Neurological:      Mental Status: She is alert. Psychiatric:         Mood and Affect: Mood normal.         Behavior: Behavior normal.         DIFFERENTIAL DIAGNOSIS:   Buccal laceration, periodontal abscess, gingivitis    DIAGNOSTIC RESULTS         LABS:   Labs Reviewed - No data to display    DEPARTMENT COURSE:   Vitals:    Vitals:    03/16/22 0117   BP: 135/86   Pulse: 83   Resp: 14   Temp: 98.1 °F (36.7 °C)   TempSrc: Oral   SpO2: 99%   Weight: 230 lb (104.3 kg)   Height: 5' 8\" (1.727 m)       MDM:  Patient presents for evaluation of possible periodontal abscess. Lesion may be developing as she is quite tender to this region. She may have also sustained injury. Secondary to risks and adverse events occurring with periodontal abscesses, will prescribe penicillin, first dose given now. She will follow-up with her dentist soon as possible and continue over-the-counter analgesics as needed as directed. CRITICAL CARE:   None    CONSULTS:  None    PROCEDURES:  None    FINAL IMPRESSION      1.  Acute periodontal abscess          DISPOSITION/PLAN   Discharge    PATIENT REFERRED TO:  Your dentist    Schedule an appointment as soon as possible for a visit in 1 week  periodontal abscess follow up      DISCHARGEMEDICATIONS:  Discharge Medication List as of 3/16/2022  1:49 AM      START taking these medications    Details   penicillin v potassium (VEETID) 500 MG tablet Take 1 tablet by mouth 4 times daily for 7 days, Disp-28 tablet, R-0Normal             (Please note that portions of this note were completedwith a voice recognition program.  Efforts were made to edit the dictations but occasionally words are mis-transcribed.)    MD Vickie Ortiz MD  03/16/22 8940

## 2022-03-16 NOTE — ED NOTES
Patient provided water to drink. Observed patient drinking water without difficulty.       Maurizio Quintanilla RN  03/16/22 5809

## 2022-03-16 NOTE — ED TRIAGE NOTES
Patient reported, \"lower mouth swelling under the tongue. I have abscess on the inner lower jaw on the left side. I had a tooth that started hurting yesterday. I called my dentist and they are in [place] on vacation. I took a duel release Advil at 0030, it is only about a 3/10 just dull. I woke up and it scared me it was swollen under my tongue. \" Patient denied problems with swallowing, or breathing.

## 2022-03-16 NOTE — ED NOTES
Observed patient sitting on the side of the bed, resp easy, drinking water. Patient stable for discharge, instructions provided. Patient educated on medications, pain control, signs and symptoms, salt water gargle, and follow up. Patient verbalized understanding, questions answered. Observed patient steadily ambulate from the department after discharge.       Jenise Gale RN  03/16/22 8311

## 2023-11-14 ENCOUNTER — HOSPITAL ENCOUNTER (EMERGENCY)
Age: 33
Discharge: HOME | End: 2023-11-14
Payer: COMMERCIAL

## 2023-11-14 VITALS
BODY MASS INDEX: 40.16 KG/M2 | OXYGEN SATURATION: 100 % | HEART RATE: 85 BPM | RESPIRATION RATE: 18 BRPM | TEMPERATURE: 97.6 F | SYSTOLIC BLOOD PRESSURE: 149 MMHG | DIASTOLIC BLOOD PRESSURE: 87 MMHG | HEIGHT: 68 IN | WEIGHT: 265 LBS

## 2023-11-14 DIAGNOSIS — J01.90 ACUTE BACTERIAL SINUSITIS: Primary | ICD-10-CM

## 2023-11-14 DIAGNOSIS — B96.89 ACUTE BACTERIAL SINUSITIS: Primary | ICD-10-CM

## 2023-11-14 PROCEDURE — 99213 OFFICE O/P EST LOW 20 MIN: CPT

## 2023-11-14 RX ORDER — AMOXICILLIN AND CLAVULANATE POTASSIUM 875; 125 MG/1; MG/1
1 TABLET, FILM COATED ORAL 2 TIMES DAILY
Qty: 20 TABLET | Refills: 0 | Status: SHIPPED | OUTPATIENT
Start: 2023-11-14 | End: 2023-11-24

## 2023-11-14 RX ORDER — PREDNISONE 20 MG/1
20 TABLET ORAL 2 TIMES DAILY
Qty: 10 TABLET | Refills: 0 | Status: SHIPPED | OUTPATIENT
Start: 2023-11-14 | End: 2023-11-19

## 2023-11-14 ASSESSMENT — ENCOUNTER SYMPTOMS
DIARRHEA: 0
SHORTNESS OF BREATH: 0
COUGH: 1
SINUS PRESSURE: 1
NAUSEA: 0
ABDOMINAL PAIN: 0
SORE THROAT: 0
VOMITING: 0
SINUS PAIN: 1

## 2023-11-14 ASSESSMENT — PAIN DESCRIPTION - ONSET: ONSET: ON-GOING

## 2023-11-14 ASSESSMENT — PAIN DESCRIPTION - LOCATION: LOCATION: HEAD

## 2023-11-14 ASSESSMENT — PAIN DESCRIPTION - FREQUENCY: FREQUENCY: CONTINUOUS

## 2023-11-14 ASSESSMENT — PAIN DESCRIPTION - DESCRIPTORS: DESCRIPTORS: ACHING

## 2023-11-14 ASSESSMENT — PAIN SCALES - GENERAL: PAINLEVEL_OUTOF10: 6

## 2023-11-14 ASSESSMENT — PAIN DESCRIPTION - PAIN TYPE: TYPE: ACUTE PAIN

## 2023-11-14 ASSESSMENT — PAIN - FUNCTIONAL ASSESSMENT
PAIN_FUNCTIONAL_ASSESSMENT: ACTIVITIES ARE NOT PREVENTED
PAIN_FUNCTIONAL_ASSESSMENT: 0-10

## 2023-11-14 NOTE — ED NOTES
Discharge instructions and prescriptions reviewed with pt. Pt verbalized understanding. Pt ambulated out in stable condition. Assessment unchanged upon discharge.      Freddy Saeed RN  11/14/23 4546

## 2023-11-14 NOTE — ED TRIAGE NOTES
Pt to urgent care due to sinus congestion ,headache and a cough. New onset of symptoms over 1 week ago.

## 2023-11-14 NOTE — ED PROVIDER NOTES
[address] Encounter      [address]       Chief Complaint   Patient presents with    Sinusitis    Cough    Headache       Nurses Notes reviewed and I agree except as noted in the HPI. HISTORY OF PRESENT ILLNESS   Rizwan Powell is a 35 y.o. female who presents to the urgent care. She presents for evaluation of symptoms that started a week and a half ago. Continues to have sinus congestion cough and headache. Medications improving symptoms. The patient/patient representative has no other acute complaints at this time. REVIEW OF SYSTEMS     Review of Systems   Constitutional:  Negative for chills, fatigue and fever. HENT:  Positive for congestion, sinus pressure and sinus pain. Negative for ear pain and sore throat. Respiratory:  Positive for cough. Negative for shortness of breath. Cardiovascular:  Negative for chest pain. Gastrointestinal:  Negative for abdominal pain, diarrhea, nausea and vomiting. Skin:  Negative for rash. Allergic/Immunologic: Negative for environmental allergies and food allergies. Neurological:  Negative for headaches. Hematological:  Negative for adenopathy. PAST MEDICAL HISTORY         Diagnosis Date    Headache     Nocturia     doesn't wake up at night to urinate       SURGICAL HISTORY     Patient  has a past surgical history that includes Bladder surgery (2013) and Carpal tunnel release (Bilateral, 2017). CURRENT MEDICATIONS       Discharge Medication List as of 11/14/2023  5:11 PM        CONTINUE these medications which have NOT CHANGED    Details   ibuprofen (ADVIL;MOTRIN) 200 MG tablet Take 400 mg by mouth every 6 hours as needed for PainHistorical Med      MedroxyPROGESTERone Acetate (DEPO-PROVERA IM) Inject into the muscle             ALLERGIES     Patient is has No Known Allergies. FAMILY HISTORY     Patient'sfamily history includes Cancer in her mother; Stroke in her father.     SOCIAL HISTORY     Patient

## 2024-10-24 ENCOUNTER — HOSPITAL ENCOUNTER (OUTPATIENT)
Age: 34
Discharge: HOME | End: 2024-10-24
Payer: COMMERCIAL

## 2024-10-24 ENCOUNTER — HOSPITAL ENCOUNTER (OUTPATIENT)
Dept: CT IMAGING | Age: 34
Discharge: HOME | End: 2024-10-24
Payer: COMMERCIAL

## 2024-10-24 DIAGNOSIS — H04.031 CHRONIC ENLARGEMENT OF RIGHT LACRIMAL GLAND: ICD-10-CM

## 2024-10-24 LAB
BASOPHILS # BLD: 0.4 % (ref 0–3)
BASOPHILS ABSOLUTE: 0 THOU/MM3 (ref 0–0.1)
EOSINOPHILS ABSOLUTE: 0.1 THOU/MM3 (ref 0–0.5)
EOSINOPHILS RELATIVE PERCENT: 1.4 % (ref 0–4)
HCT VFR BLD CALC: 43 % (ref 37–47)
HEMOGLOBIN: 14.4 GM/DL (ref 12–16)
IMMATURE GRANS (ABS): 0 THOU/MM3 (ref 0–0.07)
IMMATURE GRANULOCYTES %: 0 %
LYMPHOCYTES # BLD AUTO: 27.2 % (ref 15–47)
LYMPHOCYTES ABSOLUTE: 2.8 THOU/MM3 (ref 1–4.8)
MCH RBC QN AUTO: 30.3 PG (ref 26–32)
MCHC RBC AUTO-ENTMCNC: 33.5 GM/DL (ref 31–35)
MCV RBC AUTO: 90.5 FL (ref 81–99)
MONOCYTES: 0.7 THOU/MM3 (ref 0.3–1.3)
MONOCYTES: 7.2 % (ref 0–12)
NEUTROPHILS ABSOLUTE: 6.6 THOU/MM3 (ref 1.8–7.7)
PDW BLD-RTO: 12.5 % (ref 11.5–14.9)
PLATELET # BLD AUTO: 259 THOU/MM3 (ref 130–400)
PMV BLD AUTO: 9.7 FL (ref 9.4–12.4)
RBC # BLD: 4.75 MILL/MM3 (ref 4.1–5.3)
SEG NEUTROPHILS: 63.6 % (ref 43–75)
WBC # BLD: 10.3 THOU/MM3 (ref 4.8–10.8)

## 2024-10-24 PROCEDURE — 82164 ANGIOTENSIN I ENZYME TEST: CPT

## 2024-10-24 PROCEDURE — 83615 LACTATE (LD) (LDH) ENZYME: CPT

## 2024-10-24 PROCEDURE — 85025 COMPLETE CBC W/AUTO DIFF WBC: CPT

## 2024-10-24 PROCEDURE — 70481 CT ORBIT/EAR/FOSSA W/DYE: CPT

## 2024-10-24 PROCEDURE — 6360000004 HC RX CONTRAST MEDICATION: Performed by: OPTOMETRIST

## 2024-10-24 RX ORDER — IOPAMIDOL 755 MG/ML
75 INJECTION, SOLUTION INTRAVASCULAR
Status: COMPLETED | OUTPATIENT
Start: 2024-10-24 | End: 2024-10-24

## 2024-10-24 RX ADMIN — IOPAMIDOL 75 ML: 755 INJECTION, SOLUTION INTRAVENOUS at 10:45

## 2024-10-25 LAB — LDH SERPL L TO P-CCNC: 192 U/L (ref 100–190)

## 2024-10-27 LAB — ACE SERPL-CCNC: 71 U/L (ref 16–85)
